# Patient Record
(demographics unavailable — no encounter records)

---

## 2017-06-23 NOTE — ULTRASOUND REPORT
PELVIC ULTRASOUND: 06/22/2017

 

CLINICAL HISTORY: A 53-year-old female with inflammation of the cervix with cervical pain during phys
ical exam and pelvic pain on the right. 

 

TECHNIQUE: Transabdominal pelvic ultrasound performed for global evaluation. Transvaginal pelvic ultr
asound performed for detailed evaluation. Real-time scanning performed and static images obtained.

 

COMPARISON: 03/08/2017. 

 

FINDINGS:  Transabdominal and transvaginal ultrasound was done. The uterus has been removed. The maria isabel
ining cervical tissue shows no abnormality on ultrasound. No abnormality is seen either on transabdom
inal or transvaginal ultrasound. 

 

The right ovary measures 2.4 cm by 3.1 cm by 1.3 cm for a volume of 2.1 cubic centimeters. The left o
vary measures 2.1 cm by 1.7 cm by 1.0 cm for a volume of 1.8 cubic centimeters. 

 

Both ovaries are visualized but appear to show postmenopausal atrophy. 

 

IMPRESSION:  

1. PAST HISTORY OF A HYSTERECTOMY. REMAINING CERVICAL TISSUE SHOWS NO SIGNIFICANT ABNORMALITY EITHER 
ON TRANSABDOMINAL OR TRANSVAGINAL ULTRASOUND. 

 

2. OVARIES ARE UNREMARKABLE. THEY DEMONSTRATE BILATERAL POSTMENOPAUSAL ATROPHY.

 

 

 

DD:06/22/2017 17:23:56  DT: 06/22/2017 17:36  JOB #: [id number]  EXT JOB #:[id number]

## 2017-07-13 NOTE — ULTRASOUND REPORT
COMPLETE ABDOMINAL ULTRASOUND:  07/12/2017

 

CLINICAL INDICATION:  Pain.

 

COMPARISON:  CT 03/13/2017, ultrasound 03/11/2017.

 

TECHNIQUE:  Real-time scanning was performed with representative static images obtained. 

 

FINDINGS:  The liver measures 13.1 cm.  Hepatic echotexture is normal.  No intrahepatic biliary dilat
ation or focal parenchymal lesion is appreciated.  The patient is status post cholecystectomy.  The c
ommon bile duct measures 6 mm.  The pancreas appears edematous, and the patient did report localized 
tenderness of the pancreas.  The kidneys are normal, with the right measuring 9.3 cm and the left solo
suring 9.2 cm.  The spleen measures 8.1 cm, and appears unremarkable.  The abdominal aorta is normal 
in caliber.  The inferior vena cava is unremarkable.  No free fluid is present. 

 

IMPRESSION:  CHANGES OF CHOLECYSTECTOMY.  EDEMATOUS APPEARANCE OF THE PANCREAS, WITH LOCALIZED TENDER
NESS, SUGGESTIVE OF PANCREATITIS.  CORRELATION WITH ENZYMATIC EVIDENCE OF PANCREATITIS IS RECOMMENDED
.  NO ASCITES. 

 

 

 

DD:07/13/2017 9:35:14  DT: 07/13/2017 09:52  JOB #: [id number]  EXT JOB #:[id number]

## 2017-07-14 NOTE — ED PHYSICIAN DOCUMENTATION
PD HPI ABD PAIN





- Stated complaint


Stated Complaint: ABD/BACK PX





- Chief complaint


Chief Complaint: Abd Pain





- History obtained from


History obtained from: Patient, Family





- History of Present Illness


Timing - onset: How many weeks ago (5)


Timing - duration: Weeks (5)


Timing - details: Gradual onset, Still present, Waxing and waning


Quality: Cramping, Sharp, Pain


Location: Epigastric


Radiation: Upper back


Improved by: Laying still, Vomiting, Position


Worsened by: Eating, Breathing, Position, Palpation


Associated symptoms: Nausea, Vomiting, Loss of appetite


Similar symptoms before: Has not had sx before


Recently seen: Clinic





- Additional information


Additional information: 





53-year-old female with a month long history of epigastric pain nausea and 

vomiting that seems to get better if she does not eat and she has been drinking 

only tiny bits of fluid at a time because she gets pain even drinking water.  

She has had nausea and vomiting she has had a visit to her primary care doctor 

and an ultrasound is been obtained 2 days ago after discovering an elevated 

lipase on 3 July.  The ultrasound demonstrated what appears to be evidence of 

pancreatitis.  The patient has not had pancreatitis previously she has had a 

prior history of gallstones and had her gallbladder removed without common duct 

stone.  She denies consumption of alcohol and she does not know what her 

triglycerides are.She does have a history of rheumatoid arthritis and is on 

Humira.  She also has a history of silent reflux.





Review of Systems


Constitutional: denies: Fever


Eyes: denies: Decreased vision


Ears: denies: Ear pain


Nose: denies: Congestion


Throat: denies: Sore throat


Cardiac: denies: Chest pain / pressure, Palpitations


Respiratory: denies: Dyspnea, Cough


GI: reports: Abdominal Pain, Nausea, Vomiting


: denies: Dysuria, Frequency


Skin: denies: Rash, Lesions


Musculoskeletal: reports: Back pain.  denies: Neck pain, Extremity pain





PD PAST MEDICAL HISTORY





- Past Medical History


Past Medical History: Yes


Musculoskeletal: Rheumatoid arthritis





- Past Surgical History


Past Surgical History: Yes


General: Cholecystectomy, Appendectomy


/GYN: Hysterectomy


HEENT: Tonsil/Adenoidectomy





- Present Medications


Home Medications: 


 Ambulatory Orders











 Medication  Instructions  Recorded  Confirmed


 


Adalimumab [Humira]  03/13/17 


 


Atenolol 25 mg PO BID 03/13/17 03/13/17


 


Docusate Sodium 250Mg Capsule 250 mg PO DAILY #30 capsule 03/13/17 





[Colace 250Mg Capsule]   


 


Gabapentin 600 mg PO QID 03/13/17 03/13/17


 


Hydrocodone/Acetaminophen [Norco 1 each PO Q6H PRN #20 tablet 03/13/17 





5-325 Tablet]   


 


Meloxicam 7.5 mg PO BID 03/13/17 03/13/17


 


Naproxen 375 mg PO BID #20 tablet 03/13/17 


 


Omeprazole 40 mg PO DAILY 03/13/17 03/13/17


 


Sucralfate 1 gm PO QID #40 tablet 03/13/17 


 


HYDROcod/ACETAM 5/325 [Norco 5/325] 1 - 2 ea PO Q6H PRN #15 tablet 07/14/17 


 


Ondansetron Odt [Zofran] 4 mg TL Q6H PRN #10 tablet 07/14/17 


 


Sucralfate [Carafate] 1 gm PO ACHS #400 ml 07/14/17 














- Allergies


Allergies/Adverse Reactions: 


 Allergies











Allergy/AdvReac Type Severity Reaction Status Date / Time


 


codeine Allergy  Unknown Verified 03/13/17 14:06














- Social History


Does the pt smoke?: No


Smoking Status: Never smoker





PD ED PE NORMAL





- Vitals


Vital signs reviewed: Yes (hypertensive)





- General


General: Well developed/nourished, Other (The patient appears to be in pain 

with  tone and flattened affect she is clutching her abdomen and 

leaning forward.)





- HEENT


HEENT: Atraumatic, PERRL





- Neck


Neck: Supple, no meningeal sign





- Cardiac


Cardiac: RRR, No murmur





- Respiratory


Respiratory: No respiratory distress, Clear bilaterally





- Abdomen


Abdomen: Soft, Other (There is epigastric tenderness that is reproducible and 

there is not other abdominal tenderness. )





- Back


Back: No CVA TTP, No spinal TTP





- Derm


Derm: Normal color, Warm and dry, No rash





- Extremities


Extremities: No deformity, No edema





- Neuro


Neuro: No motor deficit, No sensory deficit





- Psych


Psych: Normal mood





Results





- Vitals


Vitals: 


 Vital Signs - 24 hr











  07/14/17 07/14/17 07/14/17





  10:12 14:07 14:46


 


Temperature 36.6 C  36.5 C


 


Heart Rate 84 66 69


 


Respiratory 18 18 14





Rate   


 


Blood Pressure 139/76 H 130/76 128/73


 


O2 Saturation 99 99 98








 Oxygen











O2 Source                      Room air

















- Labs


Labs: 


 Laboratory Tests











  07/14/17 07/14/17 07/14/17





  11:00 11:00 11:00


 


WBC  6.7  


 


RBC  4.86  


 


Hgb  14.4  


 


Hct  42.2  


 


MCV  86.9  


 


MCH  29.7  


 


MCHC  34.2  


 


RDW  13.0  


 


Plt Count  213  


 


MPV  8.7  


 


Neut #  4.3  


 


Lymph #  1.7  


 


Mono #  0.6  


 


Eos #  0.0  


 


Baso #  0.0  


 


Absolute Nucleated RBC  0.00  


 


Nucleated RBCs  0.0  


 


Sodium   140 


 


Potassium   3.9 


 


Chloride   104 


 


Carbon Dioxide   28 


 


Anion Gap   8.0 


 


BUN   13 


 


Creatinine   0.9 


 


Estimated GFR (MDRD)   65 L 


 


Glucose   104 H 


 


Calcium   9.6 


 


Total Bilirubin   0.7 


 


AST   22 


 


ALT   21 


 


Alkaline Phosphatase   40 L 


 


Troponin I    < 0.04


 


Total Protein   7.1 


 


Albumin   4.3 


 


Globulin   2.8 


 


Albumin/Globulin Ratio   1.5 


 


Triglycerides   119 


 


Lipase   39 


 


Urine Color   


 


Urine Clarity   


 


Urine pH   


 


Ur Specific Gravity   


 


Urine Protein   


 


Urine Glucose (UA)   


 


Urine Ketones   


 


Urine Occult Blood   


 


Urine Nitrite   


 


Urine Bilirubin   


 


Urine Urobilinogen   


 


Ur Leukocyte Esterase   


 


Ur Microscopic Review   


 


Urine Culture Comments   


 


Ethyl Alcohol   < 5.0 


 


H. pylori IgG Antibody   














  07/14/17 07/14/17





  11:00 12:00


 


WBC  


 


RBC  


 


Hgb  


 


Hct  


 


MCV  


 


MCH  


 


MCHC  


 


RDW  


 


Plt Count  


 


MPV  


 


Neut #  


 


Lymph #  


 


Mono #  


 


Eos #  


 


Baso #  


 


Absolute Nucleated RBC  


 


Nucleated RBCs  


 


Sodium  


 


Potassium  


 


Chloride  


 


Carbon Dioxide  


 


Anion Gap  


 


BUN  


 


Creatinine  


 


Estimated GFR (MDRD)  


 


Glucose  


 


Calcium  


 


Total Bilirubin  


 


AST  


 


ALT  


 


Alkaline Phosphatase  


 


Troponin I  


 


Total Protein  


 


Albumin  


 


Globulin  


 


Albumin/Globulin Ratio  


 


Triglycerides  


 


Lipase  


 


Urine Color   YELLOW


 


Urine Clarity   CLEAR


 


Urine pH   7.0


 


Ur Specific Gravity   <=1.005


 


Urine Protein   NEGATIVE


 


Urine Glucose (UA)   NEGATIVE


 


Urine Ketones   NEGATIVE


 


Urine Occult Blood   NEGATIVE


 


Urine Nitrite   NEGATIVE


 


Urine Bilirubin   NEGATIVE


 


Urine Urobilinogen   0.2 (NORMAL)


 


Ur Leukocyte Esterase   NEGATIVE


 


Ur Microscopic Review   NOT INDICATED


 


Urine Culture Comments   NOT INDICATED


 


Ethyl Alcohol  


 


H. pylori IgG Antibody  Negative 














- Rads (name of study)


  ** CT abdomen pelvis with


Radiology: Prelim report reviewed (Impression: 1.  Normal CT imaging of the 

pancreas and peripancreatic space.2.  Status postcholecystectomy, with mild 

intra-and extrahepatic bile duct dilation without visualizing filling defect, 

probably secondary to changes of cholecystectomy.3.  Persistent mild hepatic 

steatosis and a small hepatic nodule in the lower right lobe, 4 mm, probable 

small hemangioma, otherwise too small to further characterize.4.  Negative for 

bowel obstruction or inflammatory changes.), EMP read indepedently, See rad 

report





PD MEDICAL DECISION MAKING





- ED course


Complexity details: reviewed old records, reviewed results, re-evaluated patient

, considered differential, d/w patient, d/w family


ED course: 


52 y/o female with a history of silent GERD on omeprezole has developed 

epigastric pain radiating to her back and nausea and vomiting for the past 

month. She feels that her omeprazole has stopped working and here in the 

emergency department she is administered a GI cocktail consisting of viscous 

lidocaine Mylanta and Carafate.  She does have some improvement but not 

resolution of her symptoms.  She is subsequently given more Dilaudid and Zofran 

as well as IV Pepcid.





Departure





- Departure


Disposition: 01 Home, Self Care


Clinical Impression: 


Gastritis


Qualifiers:


 Gastritis type: other gastritis Chronicity: acute Gastritis bleeding: presence 

of bleeding unspecified Qualified Code(s): K29.00 - Acute gastritis without 

bleeding


Condition: Stable


Instructions:  ED PUD Vs Gastritis


Follow-Up: 


Diogenes Martin MD [Primary Care Provider] - 


Prescriptions: 


Sucralfate [Carafate] 1 gm PO ACHS #400 ml


HYDROcod/ACETAM 5/325 [[place] 5/325] 1 - 2 ea PO Q6H PRN #15 tablet


 PRN Reason: Pain


Ondansetron Odt [Zofran] 4 mg TL Q6H PRN #10 tablet


 PRN Reason: Nausea / Vomiting


Comments: 


Today it appears you have inflammation in the stomach or duodenum and this 

likely represents an ulcer. Switch your medication for acid in the stomach to 

Pepcid and take the carafate as prescribed before meals and at bedtime. Use the 

vicoden as needed and follow up with the surgeon for a look inside the stomach. 


Discharge Date/Time: 07/14/17 14:55

## 2017-07-14 NOTE — CT REPORT
EXAM:

CT ABDOMEN AND PELVIS

 

EXAM DATE: 7/14/2017 11:54 AM.

 

CLINICAL HISTORY: Epigastric pain/pancreatitis radiating to back. .

 

COMPARISONS: 3/13/2017.

 

TECHNIQUE: Routine helical CT imaging was performed through the abdomen and pelvis. IV contrast: 100 
cc Isovue 300. Enteric contrast: No. Reconstructions: Coronal and sagittal.

 

In accordance with CT protocol optimization, one or more of the following dose reduction techniques w
ere utilized for this exam: automated exposure control, adjustment of mA and/or KV based on patient s
ize, or use of iterative reconstructive technique.

 

FINDINGS: 

Lung Bases: Unremarkable.

 

Liver: The hyper enhancing hepatic nodule 4 mm in the lower part of segment 6, probably a small heman
gioma, unchanged. There is again diffuse mild low density without contour changes or new masses.

 

Gallbladder/Bile Ducts: Status post cholecystectomy with mild intra extrahepatic bile duct dilatation
, and borderline dilated common bile duct without bile duct filling defect, no significant interval c
hanges.

 

Spleen: Normal.

 

Pancreas: Normal.

 

Adrenal Glands: Normal.

 

Kidneys: No stone, masses or hydronephrosis.

 

Peritoneal Cavity/Bowel: Normal. No free fluid, free air or adenopathy. No masses or acute inflammato
ry process. The appendix is likely surgically absent.

 

Pelvic Organs: Normal. The bladder and visualized pelvic organs are within normal limits.

 

Vasculature: No aneurysms or other significant abnormality.

 

Bones: No significant abnormality.

 

Other: No hernia seen.

 

IMPRESSION: 

1. Normal CT imaging of the pancreas and peripancreatic space.

2. Status post cholecystectomy, with mild intra-and extrahepatic bile duct dilatation without visuali
zed filling defect, probable secondary changes of the cholecystectomy 

3. Persistent mild hepatic steatosis and a small hepatic nodule in the lower right lobe, 4 mm, probab
le small hemangioma, otherwise too small to further characterized. 

4. Negative for bowel obstruction or inflammatory changes.

 

[facility]

Referring Provider Line: [phone]

 

SITE ID: [id number]

## 2017-07-14 NOTE — CT PRELIMINARY REPORT
Accession: [id number]

Exam: CT Abdomen/Pelvis W/

 

IMPRESSION: 

1. Normal CT imaging of the pancreas and peripancreatic space.

2. Status post cholecystectomy, with mild intra-and extrahepatic bile duct dilatation without visuali
zed filling defect, probable secondary changes of the cholecystectomy 

3. Persistent mild hepatic steatosis and a small hepatic nodule in the lower right lobe, 4 mm, probab
le small hemangioma, otherwise too small to further characterized. 

4. Negative for bowel obstruction or inflammatory changes.

 

\[facility]\""A

 

SITE ID: [id number]

## 2017-09-03 NOTE — CT REPORT
EXAM:

CT HEAD

 

EXAM DATE: 9/3/2017 01:48 PM.

 

CLINICAL HISTORY: Increasing headaches Left facial numbness.

 

COMPARISON: None.

 

TECHNIQUE: Multiaxial CT images were obtained from the foramen magnum to the vertex. IV contrast: Non
e. Reformats: Coronal.

 

In accordance with CT protocol optimization, one or more of the following dose reduction techniques w
ere utilized for this exam: automated exposure control, adjustment of mA and/or KV based on patient s
ize, or use of iterative reconstructive technique.

 

FINDINGS:

Parenchyma: No intraparenchymal hemorrhage. No evidence of mass, midline shift, or CT findings of inf
arction. Gray-white differentiation is distinct.

 

Extraaxial Spaces: Normal for age. No subdural or epidural collections identified.

 

Ventricles: Normal in size and position.

 

Sinuses: Imaged paranasal sinuses, orbits, and mastoids show no significant abnormality.

 

Bones: No evidence of fracture or calvarial defect.

 

Other: None.

 

IMPRESSION: Negative nonenhanced head CT.

 

[facility]

Referring Provider Line: [phone]

 

SITE ID: [id number]

## 2017-09-03 NOTE — CT PRELIMINARY REPORT
Accession: [id number]

Exam: CT Head W/O

 

IMPRESSION: Negative nonenhanced head CT.

 

[facility]

 

SITE ID: [id number]

## 2017-09-03 NOTE — CT PRELIMINARY REPORT
Accession: [id number]

Exam: CT Neck Soft Tissue W/O

 

IMPRESSION: 

1. Nonspecific asymmetric fullness at the left tongue base, more likely lingual tonsil hypertrophy th
an tumor mass.

2. Unremarkable appearance of the thyroid gland and major salivary glands, for noncontrast technique.


3. Soft tissue neck CT findings are otherwise negative for mass.

4. If there is additional clinical concern for acute or focal soft tissue neck pathology, consider fo
llow-up with contrast-enhanced CT or MRI of the neck, as clinically warranted.

5. No definitive explanation on this study for left facial numbness, if there is additional clinical 
concern, MRI of the brain and posterior fossa without and with contrast could be considered. Findings
 are equivocal for asymmetry of the contours of the foramen ovale.

 

[facility]

 

SITE ID: [id number]

## 2017-09-03 NOTE — CT REPORT
EXAM:

CT SOFT TISSUE NECK

 

EXAM DATE: 9/3/2017 11:33 AM.

 

HISTORY: Clinical concern for left anterior neck mass. Left facial numbness.

 

COMPARISONS: None.

 

TECHNIQUE: Routine soft tissue neck CT protocol. IV contrast: None. Reconstructions: Coronal and sagi
ttal.

 

In accordance with CT protocol optimization, one or more of the following dose reduction techniques w
ere utilized for this exam: automated exposure control, adjustment of mA and/or KV based on patient s
ize, or use of iterative reconstructive technique.

 

FINDINGS:

No thyroid abnormality demonstrated by this noncontrast CT.

 

Unremarkable unenhanced contours of the submandibular and parotid salivary glands.

 

Nonspecific soft tissue fullness at the tongue base left greater than right. Asymmetrical lingual ton
saleem hypertrophy is more likely than tumor.

 

Otherwise unremarkable appearance of the pharynx and larynx. No evidence for other airway mass or funmi
nosis. Normal epiglottis. No typical findings of acute pharyngitis or laryngitis.

 

No evidence for cervical adenopathy or other focal neck mass or infiltrate, allowing for limitations 
of noncontrast soft tissue neck CT technique.

 

Grossly clear lung apices.

 

Comparing axial images 15 and 13 from series 3, it is possible that the left foramen ovale is larger 
than the right foramen ovale. The osseous contours otherwise appear normal and there is no evidence f
or active bone destruction or obvious active remodeling. Asymmetry may be secondary to slight patient
 asymmetry or slice angulation.

 

IMPRESSION: 

1. Nonspecific asymmetric fullness at the left tongue base, more likely lingual tonsil hypertrophy th
an tumor mass.

2. Unremarkable appearance of the thyroid gland and major salivary glands, for noncontrast technique.


3. Soft tissue neck CT findings are otherwise negative for mass.

4. If there is additional clinical concern for acute or focal soft tissue neck pathology, consider fo
llow-up with contrast-enhanced CT or MRI of the neck, as clinically warranted.

5. No definitive explanation on this study for left facial numbness, if there is additional clinical 
concern, MRI of the brain and posterior fossa without and with contrast could be considered. Findings
 are equivocal for asymmetry of the contours of the foramen ovale.

 

[facility]

Referring Provider Line: [phone]

 

SITE ID: [id number]

## 2017-09-03 NOTE — ED PHYSICIAN DOCUMENTATION
PD HPI FOCAL NEURO





- Stated complaint


Stated Complaint: HEADACHE/NUMBNESS





- Chief complaint


Chief Complaint: Neuro





- History obtained from


History obtained from: Patient





- Additional information


Additional information: 


Is a 53-year-old female with a long history of migrainous headaches on Imitrex 

to complains about increased frequency of headaches over the past couple of 

weeks.  She normally takes Imitrex with improvement.  On occasion she also gets 

a numb tingly sensation in the left side of her face which is been present for 

a couple weeks.  She also has had some difficulty of swallowing and has a 

sensation that there is a lump in her throat.  She is also had weight loss and 

recently was seen by general surgeon and had endoscopy done.  They did find 

some mild nonspecific inflammation of her pancreas on CT scan that was never 

consistent with enzyme level testing.  She denies any chest pain or shortness 

of breath.  There is no nausea vomiting constipation diarrhea or lower urinary 

symptoms.





Review of systems:





For pertinent positive and negatives in the review of systems please see the 

history of present illness, otherwise all other systems have been reviewed and 

are negative.





Dragon disclaimer:





Parts of this medical record were created using voice recognition technology.  

Because of the inherent limitations of this system, occasional same sounding 

word substitutions do occur and persist despite proofreading.  Please read the 

document for context.











PD PAST MEDICAL HISTORY





- Past Medical History


Cardiovascular: Hypertension, Other


Respiratory: None


Neuro: Headache/migraine


Endocrine/Autoimmune: None


GI: GERD


: None


HEENT: None


Psych: None


Musculoskeletal: Rheumatoid arthritis


Derm: None





- Past Surgical History


Past Surgical History: Yes


General: Cholecystectomy, Appendectomy


/GYN: Hysterectomy


HEENT: Tonsil/Adenoidectomy





- Present Medications


Home Medications: 


 Ambulatory Orders











 Medication  Instructions  Recorded  Confirmed


 


Adalimumab [Humira] 20 mg INJ ONCE 03/13/17 09/03/17


 


Atenolol 25 mg PO DAILY 03/13/17 09/03/17


 


Gabapentin 600 mg PO QID 03/13/17 09/03/17


 


Sucralfate 1 gm PO QID #40 tablet 03/13/17 09/03/17


 


Eszopiclone [Lunesta] 3 mg PO ONCE PRN #18 tablet 09/03/17 


 


Sumatriptan [Imitrex] 25 mg PO PRN PRN 09/03/17 09/03/17


 


raNITIdine [Zantac] 150 mg pe PO DAILY 09/03/17 09/03/17














- Allergies


Allergies/Adverse Reactions: 


 Allergies











Allergy/AdvReac Type Severity Reaction Status Date / Time


 


codeine Allergy  Unknown Verified 03/13/17 14:06














- Social History


Does the pt smoke?: No


Smoking Status: Never smoker





PD ED PE NORMAL





- Vitals


Vital signs reviewed: Yes





- General


General: Alert and oriented X 3, No acute distress





- HEENT


HEENT: Atraumatic, PERRL, EOMI, Pharynx benign, Dentition benign





- Neck


Neck: Supple, no meningeal sign, No JVD, No bruit, Other (Mild goiter on 

examination)





- Cardiac


Cardiac: RRR, No murmur





- Respiratory


Respiratory: No respiratory distress





- Abdomen


Abdomen: Normal bowel sounds





- Derm


Derm: Normal color, Warm and dry





- Extremities


Extremities: No deformity, No tenderness to palpate, Normal ROM s pain, No edema





- Neuro


Neuro: Alert and oriented X 3, CNs 2-12 intact, No motor deficit, No sensory 

deficit





Results





- Vitals


Vitals: 


 Vital Signs - 24 hr











  09/03/17 09/03/17 09/03/17





  09:56 11:47 12:45


 


Temperature 36.5 C  


 


Heart Rate 82 67 67


 


Respiratory 16 16 16





Rate   


 


Blood Pressure 142/92 H 130/82 H 112/66


 


O2 Saturation 98 100 98














  09/03/17





  14:50


 


Temperature 


 


Heart Rate 72


 


Respiratory 16





Rate 


 


Blood Pressure 134/78 H


 


O2 Saturation 98








 Oxygen











O2 Source                      Room air

















- Labs


Labs: 


 Laboratory Tests











  09/03/17 09/03/17 09/03/17





  11:23 11:23 11:23


 


WBC  4.4 L  


 


RBC  4.43  


 


Hgb  13.1  


 


Hct  38.2  


 


MCV  86.3  


 


MCH  29.5  


 


MCHC  34.2  


 


RDW  14.0  


 


Plt Count  211  


 


MPV  8.8  


 


Neut #  1.6  


 


Lymph #  2.1  


 


Mono #  0.5  


 


Eos #  0.2  


 


Baso #  0.0  


 


Absolute Nucleated RBC  0.00  


 


Nucleated RBCs  0.0  


 


Sodium   143 


 


Potassium   3.5 


 


Chloride   106 


 


Carbon Dioxide   29 


 


Anion Gap   8.0 


 


BUN   9 


 


Creatinine   0.9 


 


Estimated GFR (MDRD)   65 L 


 


Glucose   85 


 


Calcium   9.8 


 


Total Bilirubin   0.4 


 


AST   22 


 


ALT   14 


 


Alkaline Phosphatase   40 L 


 


Total Protein   7.3 


 


Albumin   4.4 


 


Globulin   2.9 


 


Albumin/Globulin Ratio   1.5 


 


Lipase   21 L 


 


TSH    0.32 L


 


Free T4   


 


Free T3 pg/mL   


 


Urine Color   


 


Urine Clarity   


 


Urine pH   


 


Ur Specific Gravity   


 


Urine Protein   


 


Urine Glucose (UA)   


 


Urine Ketones   


 


Urine Occult Blood   


 


Urine Nitrite   


 


Urine Bilirubin   


 


Urine Urobilinogen   


 


Ur Leukocyte Esterase   


 


Ur Microscopic Review   


 


Urine Culture Comments   














  09/03/17 09/03/17 09/03/17





  12:17 13:00 13:00


 


WBC   


 


RBC   


 


Hgb   


 


Hct   


 


MCV   


 


MCH   


 


MCHC   


 


RDW   


 


Plt Count   


 


MPV   


 


Neut #   


 


Lymph #   


 


Mono #   


 


Eos #   


 


Baso #   


 


Absolute Nucleated RBC   


 


Nucleated RBCs   


 


Sodium   


 


Potassium   


 


Chloride   


 


Carbon Dioxide   


 


Anion Gap   


 


BUN   


 


Creatinine   


 


Estimated GFR (MDRD)   


 


Glucose   


 


Calcium   


 


Total Bilirubin   


 


AST   


 


ALT   


 


Alkaline Phosphatase   


 


Total Protein   


 


Albumin   


 


Globulin   


 


Albumin/Globulin Ratio   


 


Lipase   


 


TSH   


 


Free T4   1.77 H 


 


Free T3 pg/mL    3.53


 


Urine Color  YELLOW  


 


Urine Clarity  CLEAR  


 


Urine pH  7.5  


 


Ur Specific Gravity  1.010  


 


Urine Protein  NEGATIVE  


 


Urine Glucose (UA)  NEGATIVE  


 


Urine Ketones  NEGATIVE  


 


Urine Occult Blood  NEGATIVE  


 


Urine Nitrite  NEGATIVE  


 


Urine Bilirubin  NEGATIVE  


 


Urine Urobilinogen  0.2 (NORMAL)  


 


Ur Leukocyte Esterase  NEGATIVE  


 


Ur Microscopic Review  NOT INDICATED  


 


Urine Culture Comments  NOT INDICATED  














PD MEDICAL DECISION MAKING





- ED course


ED course: 


Patient is a pleasant 53-year-old female with history of migraine headaches and 

rheumatoid arthritis.  She presents with increasingly worse headache frequency 

occasional numbness and tingling to her face and a feeling of a lump in her 

throat.  On examination she does have a goiter.  She has no history of thyroid 

problems.  A CT scan of the head and neck were done and are unremarkable.  Her 

thyroid was generous on physical examination thorough thyroid studies were 

done.  Her TSH is actually low and her T4 is actually elevated.  This is 

consistent with mild hyperthyroidism.  Given the fact that she has goiter and 

hyperthyroidism I am concerned about a possible thyroiditis.  Her T3 is normal.

  She has no symptoms that warrant emergent treatment and has a follow-up 

appointment next week with her new internist.  I have copied the lab results 

for her so that they can hand carry them to their new internist.  I am 

recommending additional thyroid studies and believe she probably will need a 

thyroid ultrasound in the future as well.  It is possible that her thyroid 

condition is explaining her multiple symptoms.





Clinical impression:


1.  Mild hyperthyroidism with goiter





Disposition: To home








Departure





- Departure


Disposition: 01 Home, Self Care


Clinical Impression: 


 Hyperthyroidism





Head ache


Qualifiers:


 Headache type: unspecified Headache chronicity pattern: acute headache 


Condition: Good


Instructions:  ED Hyperthyroidism, ED Headache Migraine


Follow-Up: 


Diogenes Martin MD [Primary Care Provider] - 


Prescriptions: 


Eszopiclone [Lunesta] 3 mg PO ONCE PRN #18 tablet


 PRN Reason: Insomnia

## 2017-09-05 NOTE — ED PHYSICIAN DOCUMENTATION
PD HPI NVD





- Stated complaint


Stated Complaint: VOMITING





- Chief complaint


Chief Complaint: Abd Pain





- History obtained from


History obtained from: Patient, Family





- History of Present Illness


Timing - onset: How many days ago (3)


Timing - duration: Days (3)


Timing - details: Gradual onset, Still present


Associated symptoms: Abdominal pain, Dizzy, Loss of appetite, Weight loss (25lb)


Improved by: Laying still


Worsened by: Moving, Position, Palpation


Similar symptoms before: Diagnosis (gastritis)


Recently seen: Emergency Dept (Seen in the ED 2 days ago with acute migraine 

with left facial numbness. Had Ct and labs done then recived fluids and found 

hyperthyroid.)





- Additonal information


Additional information: 





53-year-old female with a prior history of cholelithiasis has had her 

gallbladder out and she is subsequently had an episode of pancreatitis and this 

has resolved and she has had upper endoscopy and has been on some Carafate for 

gastritis.  She has developed some left facial numbness dizziness headache 

nausea and vomiting.  She was seen in the emergency department given a cocktail 

of medications for migraine and she improved.  She continued to have some 

dizziness and left facial numbness.She was diagnosed with hyperthyroidism on 

her visit to the ED 2 days ago. She describes both dizziness in the form of 

vertigo and dizziness in the form of lightheadedness. 





Review of Systems


Constitutional: reports: Chills, Myalgias, Fatigue, Sweats


Eyes: denies: Decreased vision


Ears: reports: Ear pain


Nose: denies: Rhinorrhea / runny nose, Congestion


Throat: reports: Sore throat


Cardiac: denies: Chest pain / pressure, Palpitations


Respiratory: reports: Cough.  denies: Dyspnea


GI: reports: Abdominal Pain, Nausea, Vomiting


: reports: Frequency.  denies: Dysuria


Skin: denies: Rash


Musculoskeletal: denies: Neck pain, Back pain, Extremity pain


Neurologic: reports: Generalized weakness.  denies: Focal weakness, Numbness, 

Headache, Head injury, LOC





PD PAST MEDICAL HISTORY





- Past Medical History


Past Medical History: Yes


Cardiovascular: Hypertension, Other


Respiratory: None


Neuro: Headache/migraine


Endocrine/Autoimmune: None


GI: GERD


: None


HEENT: None


Psych: None


Musculoskeletal: Rheumatoid arthritis


Derm: None





- Past Surgical History


Past Surgical History: Yes


General: Cholecystectomy, Appendectomy


/GYN: Hysterectomy


HEENT: Tonsil/Adenoidectomy





- Present Medications


Home Medications: 


 Ambulatory Orders











 Medication  Instructions  Recorded  Confirmed


 


Adalimumab [Humira] 20 mg INJ ONCE 03/13/17 09/05/17


 


Atenolol 25 mg PO DAILY 03/13/17 09/05/17


 


Gabapentin 600 mg PO QID 03/13/17 09/05/17


 


Sucralfate 1 gm PO QID #40 tablet 03/13/17 09/05/17


 


Eszopiclone [Lunesta] 3 mg PO ONCE PRN #18 tablet 09/03/17 09/05/17


 


Sumatriptan [Imitrex] 25 mg PO PRN PRN 09/03/17 09/05/17


 


raNITIdine [Zantac] 150 mg pe PO DAILY 09/03/17 09/05/17


 


Meclizine [Antivert] 25 mg PO Q6H #20 tablet 09/05/17 


 


Methimazole [Tapazole] 10 mg PO DAILY #20 tablet 09/05/17 














- Allergies


Allergies/Adverse Reactions: 


 Allergies











Allergy/AdvReac Type Severity Reaction Status Date / Time


 


codeine Allergy  Unknown Verified 09/05/17 09:03














- Social History


Does the pt smoke?: No


Smoking Status: Never smoker





PD ED PE NORMAL





- Vitals


Vital signs reviewed: Yes (hypertensive)





- General


General: Alert and oriented X 3, Well developed/nourished





- HEENT


HEENT: Atraumatic, PERRL, EOMI, Ears normal, Other (dry mucous membranes)





- Neck


Neck: Supple, no meningeal sign, Other (The thyroid is generous in size nodular 

and tender. )





- Cardiac


Cardiac: RRR, No murmur





- Respiratory


Respiratory: No respiratory distress, Clear bilaterally





- Abdomen


Abdomen: Soft, Non tender





- Back


Back: No CVA TTP, No spinal TTP





- Derm


Derm: Normal color, No rash





- Extremities


Extremities: No deformity, No edema





- Neuro


Neuro: No motor deficit, No sensory deficit





- Psych


Psych: Normal mood, Normal affect





Results





- Vitals


Vitals: 


 Vital Signs - 24 hr











  09/05/17 09/05/17 09/05/17





  09:00 10:40 12:34


 


Temperature 36.1 C L 36.5 C 36.6 C


 


Heart Rate 75 78 88


 


Respiratory 16 15 16





Rate   


 


Blood Pressure 160/95 H 138/93 H 122/80


 


O2 Saturation 100 98 98














  09/05/17 09/05/17





  14:06 16:43


 


Temperature 36.5 C 


 


Heart Rate 78 80


 


Respiratory 16 16





Rate  


 


Blood Pressure 104/61 130/84 H


 


O2 Saturation 100 99








 Oxygen











O2 Source                      Room air

















- Labs


Labs: 


 Laboratory Tests











  09/05/17 09/05/17 09/05/17





  09:05 09:05 09:05


 


WBC  5.8  


 


RBC  4.44  


 


Hgb  13.2  


 


Hct  38.4  


 


MCV  86.5  


 


MCH  29.6  


 


MCHC  34.3  


 


RDW  13.7  


 


Plt Count  200  


 


MPV  9.1  


 


Neut #  3.5  


 


Lymph #  1.6  


 


Mono #  0.5  


 


Eos #  0.1  


 


Baso #  0.0  


 


Absolute Nucleated RBC  0.00  


 


Nucleated RBCs  0.0  


 


Sodium   140 


 


Potassium   3.6 


 


Chloride   104 


 


Carbon Dioxide   29 


 


Anion Gap   7.0 


 


BUN   10 


 


Creatinine   0.8 


 


Estimated GFR (MDRD)   75 L 


 


Glucose   103 H 


 


Calcium   9.4 


 


Total Bilirubin   0.5 


 


AST   21 


 


ALT   12 


 


Alkaline Phosphatase   37 L 


 


Troponin I    < 0.04


 


Total Protein   7.1 


 


Albumin   4.3 


 


Globulin   2.8 


 


Albumin/Globulin Ratio   1.5 


 


Lipase   27 


 


Urine Color   


 


Urine Clarity   


 


Urine pH   


 


Ur Specific Gravity   


 


Urine Protein   


 


Urine Glucose (UA)   


 


Urine Ketones   


 


Urine Occult Blood   


 


Urine Nitrite   


 


Urine Bilirubin   


 


Urine Urobilinogen   


 


Ur Leukocyte Esterase   


 


Ur Microscopic Review   


 


Urine Culture Comments   














  09/05/17





  13:22


 


WBC 


 


RBC 


 


Hgb 


 


Hct 


 


MCV 


 


MCH 


 


MCHC 


 


RDW 


 


Plt Count 


 


MPV 


 


Neut # 


 


Lymph # 


 


Mono # 


 


Eos # 


 


Baso # 


 


Absolute Nucleated RBC 


 


Nucleated RBCs 


 


Sodium 


 


Potassium 


 


Chloride 


 


Carbon Dioxide 


 


Anion Gap 


 


BUN 


 


Creatinine 


 


Estimated GFR (MDRD) 


 


Glucose 


 


Calcium 


 


Total Bilirubin 


 


AST 


 


ALT 


 


Alkaline Phosphatase 


 


Troponin I 


 


Total Protein 


 


Albumin 


 


Globulin 


 


Albumin/Globulin Ratio 


 


Lipase 


 


Urine Color  LIGHT YELLOW


 


Urine Clarity  CLEAR


 


Urine pH  7.5


 


Ur Specific Gravity  1.010


 


Urine Protein  NEGATIVE


 


Urine Glucose (UA)  NEGATIVE


 


Urine Ketones  NEGATIVE


 


Urine Occult Blood  NEGATIVE


 


Urine Nitrite  NEGATIVE


 


Urine Bilirubin  NEGATIVE


 


Urine Urobilinogen  0.2 (NORMAL)


 


Ur Leukocyte Esterase  NEGATIVE


 


Ur Microscopic Review  NOT INDICATED


 


Urine Culture Comments  NOT INDICATED














Procedures





- IVC sono (time)


  ** 1015


Bedside IVC sono: IVC measures (cm) (0.55), Significant dehydration





  ** 1505


Bedside IVC sono: IVC measures (cm) (1.02), Dehydration (after 2 liters saline.)





PD MEDICAL DECISION MAKING





- ED course


Complexity details: reviewed old records, reviewed results, re-evaluated patient

, considered differential, d/w patient, d/w family


ED course: 





54 y/o female with 2 weeks symptoms of lightheadedness and dizziness is found 

to be significantly dehydrated on interrogation of the IVC and she is 

administered 2 liters saline IV with improvement but not resolution of 

symptoms. She is administered a 3rd liter. She has normal counts, indices and 

electrolytes and no evidence of infection on exam or on evaluation of the 

urine. She has been found to have hyperthyroidism and has had symptoms of 

insomnia and weight loss. We were not able to contact her rheumatologist this 

afternoon but we will start her on a beta blocker and methimazole and she has 

symptoms of vertigo with nystagmus as well and she has responded treatment with 

meclinzine and we will provide this medication as well. 





Departure





- Departure


Disposition: 01 Home, Self Care


Clinical Impression: 


 Hyperthyroidism, Dehydration





Labyrinthitis


Qualifiers:


 Laterality: bilateral Qualified Code(s): H83.03 - Labyrinthitis, bilateral


Instructions:  ED Dehydration, ED Labyrinthitis, Hyperthyroidism Dc


Follow-Up: 


Your, doctor [Other]


Prescriptions: 


Meclizine [Antivert] 25 mg PO Q6H #20 tablet


Methimazole [Tapazole] 10 mg PO DAILY #20 tablet


Comments: 


Today we found you to be profoundly dehydrated and with evidence of 

labyrinthitis. Continue your atenolol and start the methimazole. Use the 

meclinzine as needed for vertigo or room spinning. 


Discharge Date/Time: 09/05/17 16:44

## 2017-09-29 NOTE — MRI REPORT
EXAM:

MR ABDOMEN WITH AND WITHOUT CONTRAST

MR PELVIS WITH AND WITHOUT CONTRAST

 

EXAM DATE: 9/29/2017 10:19 AM.

 

CLINICAL HISTORY: ABNORMAL CT ABDOMEN, UNINTENTIONAL WEIGHT LOSS.

 

COMPARISON: CT 07/14/2017.

 

TECHNIQUE: Multiplanar breath-hold T1, T2, and DWI sequences obtained through the abdomen and pelvis 
on an MR scanner. Images obtained before and after administration of intravenous contrast.

 

FINDINGS:

Abdomen:

Lung Bases: Unremarkable.

 

Liver: No suspicious mass seen in the liver. There is a tiny, 6 mm, posterior right liver chronic hem
angioma.

 

Gallbladder: Removed.

 

Pancreas: The pancreas appears normal with no mass or ductal dilatation.

 

Spleen: The spleen appears normal.

 

Kidneys: Tiny right renal cyst. Kidneys otherwise appear unremarkable without hydronephrosis.

 

Adrenals: Normal appearance.

 

Bowel: The small bowel and colon appear normal with no inflammation or obstruction. Moderate stool bu
rden in a redundant colon. No definite masses seen.

 

Retroperitoneum: The retroperitoneal structures appear normal with no mass or lymphadenopathy.

 

Pelvic organs: Post hysterectomy with no adnexal masses seen. The bladder appears within normal limit
s. 

 

IMPRESSION: 

1. Features of chronic constipation. 

2. No bowel obstruction or inflammatory process seen. 

3. No etiology for unexplained weight loss identified. 

4. Post cholecystectomy and hysterectomy.

 

[facility]

Referring Provider Line: [phone]

 

SITE ID: [id number]

## 2017-09-29 NOTE — MRI REPORT
EXAM:

MR ABDOMEN WITH AND WITHOUT CONTRAST

MR PELVIS WITH AND WITHOUT CONTRAST

 

EXAM DATE: 9/29/2017 10:19 AM.

 

CLINICAL HISTORY: ABNORMAL CT ABDOMEN, UNINTENTIONAL WEIGHT LOSS.

 

COMPARISON: CT 07/14/2017.

 

TECHNIQUE: Multiplanar breath-hold T1, T2, and DWI sequences obtained through the abdomen and pelvis 
on an MR scanner. Images obtained before and after administration of intravenous contrast.

 

FINDINGS:

Abdomen:

Lung Bases: Unremarkable.

 

Liver: No suspicious mass seen in the liver. There is a tiny, 6 mm, posterior right liver chronic hem
angioma.

 

Gallbladder: Removed.

 

Pancreas: The pancreas appears normal with no mass or ductal dilatation.

 

Spleen: The spleen appears normal.

 

Kidneys: Tiny right renal cyst. Kidneys otherwise appear unremarkable without hydronephrosis.

 

Adrenals: Normal appearance.

 

Bowel: The small bowel and colon appear normal with no inflammation or obstruction. Moderate stool bu
rden in a redundant colon. No definite masses seen.

 

Retroperitoneum: The retroperitoneal structures appear normal with no mass or lymphadenopathy.

 

Pelvic organs: Post hysterectomy with no adnexal masses seen. The bladder appears within normal limit
s. 

 

IMPRESSION: 

1. Features of chronic constipation. 

2. No bowel obstruction or inflammatory process seen. 

3. No etiology for unexplained weight loss identified. 

4. Post cholecystectomy and hysterectomy.

 

[facility]

Referring Provider Line: [phone]

 

SITE ID: [id number]